# Patient Record
Sex: MALE | Race: WHITE
[De-identification: names, ages, dates, MRNs, and addresses within clinical notes are randomized per-mention and may not be internally consistent; named-entity substitution may affect disease eponyms.]

---

## 2018-04-07 ENCOUNTER — HOSPITAL ENCOUNTER (EMERGENCY)
Dept: HOSPITAL 91 - FTE | Age: 28
LOS: 1 days | Discharge: HOME | End: 2018-04-08
Payer: COMMERCIAL

## 2018-04-07 ENCOUNTER — HOSPITAL ENCOUNTER (EMERGENCY)
Age: 28
LOS: 1 days | Discharge: HOME | End: 2018-04-08

## 2018-04-07 DIAGNOSIS — S01.512A: Primary | ICD-10-CM
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
## 2018-04-07 PROCEDURE — 99282 EMERGENCY DEPT VISIT SF MDM: CPT

## 2019-03-01 ENCOUNTER — HOSPITAL ENCOUNTER (EMERGENCY)
Dept: HOSPITAL 10 - E/R | Age: 29
Discharge: HOME | End: 2019-03-01
Payer: COMMERCIAL

## 2019-03-01 ENCOUNTER — HOSPITAL ENCOUNTER (EMERGENCY)
Dept: HOSPITAL 91 - E/R | Age: 29
Discharge: HOME | End: 2019-03-01
Payer: COMMERCIAL

## 2019-03-01 VITALS — RESPIRATION RATE: 22 BRPM | HEART RATE: 72 BPM | DIASTOLIC BLOOD PRESSURE: 85 MMHG | SYSTOLIC BLOOD PRESSURE: 129 MMHG

## 2019-03-01 VITALS
HEIGHT: 65 IN | BODY MASS INDEX: 31.88 KG/M2 | HEIGHT: 65 IN | WEIGHT: 191.36 LBS | BODY MASS INDEX: 31.88 KG/M2 | WEIGHT: 191.36 LBS

## 2019-03-01 DIAGNOSIS — H57.11: Primary | ICD-10-CM

## 2019-03-01 LAB
ADD MAN DIFF?: NO
ANION GAP: 9 (ref 5–13)
BASOPHIL #: 0.1 10^3/UL (ref 0–0.1)
BASOPHILS %: 0.7 % (ref 0–2)
BLOOD UREA NITROGEN: 23 MG/DL (ref 7–20)
CALCIUM: 9.7 MG/DL (ref 8.4–10.2)
CARBON DIOXIDE: 29 MMOL/L (ref 21–31)
CHLORIDE: 105 MMOL/L (ref 97–110)
CREATININE: 1.17 MG/DL (ref 0.61–1.24)
EOSINOPHILS #: 0 10^3/UL (ref 0–0.5)
EOSINOPHILS %: 0.6 % (ref 0–7)
GLUCOSE: 107 MG/DL (ref 70–220)
HEMATOCRIT: 52.8 % (ref 42–52)
HEMOGLOBIN: 18.7 G/DL (ref 14–18)
IMMATURE GRANS #M: 0.04 10^3/UL (ref 0–0.03)
IMMATURE GRANS % (M): 0.6 % (ref 0–0.43)
INR: 0.84
LYMPHOCYTES #: 1.6 10^3/UL (ref 0.8–2.9)
LYMPHOCYTES %: 23.2 % (ref 15–51)
MEAN CORPUSCULAR HEMOGLOBIN: 31.2 PG (ref 29–33)
MEAN CORPUSCULAR HGB CONC: 35.4 G/DL (ref 32–37)
MEAN CORPUSCULAR VOLUME: 88 FL (ref 82–101)
MEAN PLATELET VOLUME: 10.8 FL (ref 7.4–10.4)
MONOCYTE #: 0.6 10^3/UL (ref 0.3–0.9)
MONOCYTES %: 8.1 % (ref 0–11)
NEUTROPHIL #: 4.7 10^3/UL (ref 1.6–7.5)
NEUTROPHILS %: 66.8 % (ref 39–77)
NUCLEATED RED BLOOD CELLS #: 0 10^3/UL (ref 0–0)
NUCLEATED RED BLOOD CELLS%: 0 /100WBC (ref 0–0)
PARTIAL THROMBOPLASTIN TIME: 26.4 SEC (ref 23–35)
PLATELET COUNT: 255 10^3/UL (ref 140–415)
POTASSIUM: 4.3 MMOL/L (ref 3.5–5.1)
PROTIME: 11.6 SEC (ref 11.9–14.9)
PT RATIO: 0.9
RED BLOOD COUNT: 6 10^6/UL (ref 4.7–6.1)
RED CELL DISTRIBUTION WIDTH: 12.1 % (ref 11.5–14.5)
SODIUM: 143 MMOL/L (ref 135–144)
WHITE BLOOD COUNT: 7.1 10^3/UL (ref 4.8–10.8)

## 2019-03-01 PROCEDURE — 80048 BASIC METABOLIC PNL TOTAL CA: CPT

## 2019-03-01 PROCEDURE — 70450 CT HEAD/BRAIN W/O DYE: CPT

## 2019-03-01 PROCEDURE — 99284 EMERGENCY DEPT VISIT MOD MDM: CPT

## 2019-03-01 PROCEDURE — 85610 PROTHROMBIN TIME: CPT

## 2019-03-01 PROCEDURE — 36415 COLL VENOUS BLD VENIPUNCTURE: CPT

## 2019-03-01 PROCEDURE — 85025 COMPLETE CBC W/AUTO DIFF WBC: CPT

## 2019-03-01 PROCEDURE — 85730 THROMBOPLASTIN TIME PARTIAL: CPT

## 2019-03-01 NOTE — ERD
ER Documentation


Chief Complaint


Chief Complaint





C/O RT EYE PAIN, ARVIZU





HPI


The patient is a 28-year-old male, presenting to the ER because of pain behind 


the right eye with intermittent headache for about an hour and a half prior to 


arrival.  He had similar symptoms previously many years ago.  He has family 


history of CADASIL, has not follow-up with his neurologist.  He denies fever, 


chills, neck pain, chest pain, dyspnea, abdominal pain, vomiting, dysuria, 


diarrhea.  He does not smoke, drinks socially, denies illicit drug





Past medical history: Dyslipidemia


Past surgical history: None





ROS


All systems reviewed and are negative except as per history of present illness.





Allergies


Allergies:  


Coded Allergies:  


     No Known Allergy (Unverified , 18)





PMhx/Soc


Medical and Surgical Hx:  pt denies Surgical Hx


History of Surgery:  No


Anesthesia Reaction:  No


Hx Neurological Disorder:  No


Hx Respiratory Disorders:  No


Hx Cardiac Disorders:  Yes (HLD)


Hx Psychiatric Problems:  No


Hx Miscellaneous Medical Probl:  Yes (HLD)


Hx Alcohol Use:  Yes (socially)


Hx Substance Use:  No


Hx Tobacco Use:  No


Smoking Status:  Never smoker





Physical Exam


Vitals





Vital Signs


  Date      Temp  Pulse  Resp  B/P (MAP)   Pulse Ox  O2          O2 Flow    FiO2


Time                                                 Delivery    Rate


    3/1/19           72    22      129/85        95  Room Air


     06:27                          (100)


    3/1/19           72    15      150/98       100  Nasal             2.0


     05:14                          (115)            Cannula


    3/1/19  98.2     78    19     147/102        99


     04:12                          (117)





Physical Exam


 Const:      No acute distress.


 Head:        Atraumatic.


 Eyes:       Normal Conjunctiva.


 ENT:         Normal External Ears, Nose and Mouth.


 Neck:        Full range of motion.  No meningismus.


 Resp:         Clear to auscultation bilaterally.


 Cardio:       Regular rate and rhythm.


 Abd:         Soft,  non distended, normal bowel sounds, non tender.


 Skin:         No petechiae or rashes.


 Back:        No midline or flank tenderness.


 Ext:          No cyanosis, or edema.


 Neur:        Awake and alert. No focal deficit


 Psych:        Normal Mood and Affect.


Result Diagram:  


3/1/19 0558                                                                     


          3/1/19 0558





Results 24 hrs





Laboratory Tests


              Test
                                  3/1/19
05:58


              White Blood Count                      7.1 10^3/ul


              Red Blood Count                       6.00 10^6/ul


              Hemoglobin                               18.7 g/dl


              Hematocrit                                  52.8 %


              Mean Corpuscular Volume                    88.0 fl


              Mean Corpuscular Hemoglobin                31.2 pg


              Mean Corpuscular Hemoglobin
Concent     35.4 g/dl 



              Red Cell Distribution Width                 12.1 %


              Platelet Count                         255 10^3/UL


              Mean Platelet Volume                       10.8 fl


              Immature Granulocytes %                    0.600 %


              Neutrophils %                               66.8 %


              Lymphocytes %                               23.2 %


              Monocytes %                                  8.1 %


              Eosinophils %                                0.6 %


              Basophils %                                  0.7 %


              Nucleated Red Blood Cells %            0.0 /100WBC


              Immature Granulocytes #              0.040 10^3/ul


              Neutrophils #                          4.7 10^3/ul


              Lymphocytes #                          1.6 10^3/ul


              Monocytes #                            0.6 10^3/ul


              Eosinophils #                          0.0 10^3/ul


              Basophils #                            0.1 10^3/ul


              Nucleated Red Blood Cells #            0.0 10^3/ul


              Prothrombin Time                          11.6 Sec


              Prothrombin Time Ratio                         0.9


              INR International Normalized
Ratio           0.84 



              Activated Partial
Thromboplast Time      26.4 Sec 



              Sodium Level                            143 mmol/L


              Potassium Level                         4.3 mmol/L


              Chloride Level                          105 mmol/L


              Carbon Dioxide Level                     29 mmol/L


              Anion Gap                                        9


              Blood Urea Nitrogen                       23 mg/dl


              Creatinine                              1.17 mg/dl


              Est Glomerular Filtrat Rate
mL/min   > 60 mL/min 



              Glucose Level                            107 mg/dl


              Calcium Level                            9.7 mg/dl








[facility] [id number]


                        Radiology Main Line: [phone]





                            DIAGNOSTIC IMAGING REPORT





Patient: RITU JOHNSON   : 1990   Age: 28  Sex: M                       





       MR #:    [id number]   [id number]    DOS: 19 [id number]


Ordering MD: ANGIE GERMAN MD   Location:  E/R   Room/Bed:                    


                       


                                        


PROCEDURE:   CT Brain without contrast. 


 


CLINICAL INDICATION: Headache. There is a reported family history of cerebral 


autosomal dominant arteriopathy with subcortical infarcts and 


leukoencephalopathy (CADASIL), as per the ordering ER clinician.


 


TECHNIQUE:   Axial images from the skull base through the vertex without IV 


contrast.  Multiplanar reformatted images were made.  Images were reviewed on a 


PACS workstation.  The CTDIvol is 39.64 mGy and the DLP is 634.23 mGy-cm.  One 


or more of the following dose reduction techniques were used: automated exposure


control, adjustment of the mA and/or kV according to patient size, or use of 


iterative reconstruction technique.  DICOM images are available.


 


COMPARISON:   None. 


 


FINDINGS:


There is no acute intracranial hemorrhage or extraaxial fluid collection, 


midline shift or hydrocephalus.


 


Gray-white delineation is maintained without identifiable acute or recent 


territorial infarct. There are however scattered periventricular and subcortical


foci of hypodensity present within both cerebral hemispheres, which may involve 


one or both temporal lobes (versus beam-hardening artifact with imaging through 


the middle cranial fossa), and are present superiorly within the bilateral 


frontal lobes. 


 


Mastoid air cells and paranasal sinuses are clear. Intact calvarium.


 


 


IMPRESSION:


No acute intracranial hemorrhage, midline shift or hydrocephalus.


 


Patchy periventricular and subcortical white matter hypodensities which are 


greater than typically seen for a patient of this age, and are nonspecific. 


Differential could include demyelinating process/leukoencephalopathy, sequela 


from migraines, vasculitis, infectious or inflammatory etiology. Possibility of 


small scattered metastatic foci is in the differential. In the absence of 


priors, further imaging evaluation could include MRI without and with contrast, 


for further assessment and to establish a baseline for future follow up. 


 


Results were called to Dr. German at time of dictation 0540 hours


 


RPTAT: HSAF


_____________________________________________ 


Physician Joy           Date    Time 


Electronically viewed and signed by Physician Joy on 2019 


05:48 


 


D:  2019 05:48  T:  2019 05:48


RF/





CC: ANGIE GERMAN MD





[phone]





MEDICAL MAKING DECISION: The patient is a 28-year-old male, presenting with 


acute transient right eye pain. He is well without any symptoms now.  I offered 


to admit him for further evaluation but he wants to go home for outpatient 


workup which is reasonable





The differential diagnoses considered include but are not limited to 


subarachnoid hemorrhage, occult trauma, CVA, meningitis, encephalitis, 


hypertension, tension, migraine, cluster, narcotic withdrawal, cervical spine 


disease.





Departure


Diagnosis:  


   Primary Impression:  


   Pain, eye, right


   Additional Impression:  


   Headache


Condition:  Good


Patient Instructions:  Headache, Unspecified


Referrals:  


VERPUKHOVSKIY,MELVINA MD





Additional Instructions:  


The patient's blood pressure was elevated (>120/80) but appears stable without 


evidence of hypertension emergency or urgency.  The patient was counseled about 


the risks of hypertension and urged to pursue outpatient monitoring and therapy 


within a week with their primary care physician.








Call the referal physician or his neurologist TOMORROW for an appointment during


the next 2-3 days.See the doctor sooner or return here if your condition worsens


before your appointment time.











ANGIE GERMAN MD               Mar 1, 2019 23:09

## 2023-01-28 ENCOUNTER — HOSPITAL ENCOUNTER (EMERGENCY)
Dept: HOSPITAL 12 - ER | Age: 33
Discharge: HOME | End: 2023-01-28
Payer: COMMERCIAL

## 2023-01-28 VITALS — HEIGHT: 64 IN | WEIGHT: 185 LBS | BODY MASS INDEX: 31.58 KG/M2
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
## 2023-01-28 DIAGNOSIS — S01.21XA: Primary | ICD-10-CM
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
## 2023-01-28 DIAGNOSIS — R04.0: ICD-10-CM

## 2023-01-28 DIAGNOSIS — S02.2XXA: ICD-10-CM

## 2023-01-28 DIAGNOSIS — E78.00: ICD-10-CM
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
## 2023-01-28 PROCEDURE — A4663 DIALYSIS BLOOD PRESSURE CUFF: HCPCS
